# Patient Record
Sex: FEMALE | Race: WHITE | Employment: FULL TIME | URBAN - METROPOLITAN AREA
[De-identification: names, ages, dates, MRNs, and addresses within clinical notes are randomized per-mention and may not be internally consistent; named-entity substitution may affect disease eponyms.]

---

## 2018-07-19 PROBLEM — O60.00 PRETERM LABOR: Status: ACTIVE | Noted: 2018-07-19

## 2018-07-23 ENCOUNTER — HOSPITAL ENCOUNTER (EMERGENCY)
Age: 38
Discharge: HOME | End: 2018-07-23
Attending: OBSTETRICS & GYNECOLOGY | Admitting: OBSTETRICS & GYNECOLOGY
Payer: MEDICAID

## 2018-07-23 VITALS — BODY MASS INDEX: 33.82 KG/M2 | HEIGHT: 65 IN | TEMPERATURE: 97.5 F | WEIGHT: 203 LBS

## 2018-07-23 PROBLEM — Z34.90 PREGNANCY: Status: ACTIVE | Noted: 2018-07-23

## 2018-07-23 LAB
APPEARANCE UR: CLEAR
BILIRUB UR QL: NEGATIVE
COLOR UR: YELLOW
GLUCOSE UR QL STRIP.AUTO: NEGATIVE MG/DL
KETONES UR-MCNC: NEGATIVE MG/DL
LEUKOCYTE ESTERASE UR QL STRIP: ABNORMAL
NITRITE UR QL: NEGATIVE
PH UR: 7 [PH] (ref 5–9)
PROT UR QL: ABNORMAL MG/DL
RBC # UR STRIP: ABNORMAL /UL
SERVICE CMNT-IMP: ABNORMAL
SP GR UR: 1.02 (ref 1–1.02)
UROBILINOGEN UR QL: 0.2 EU/DL (ref 0.2–1)

## 2018-07-23 PROCEDURE — 99282 EMERGENCY DEPT VISIT SF MDM: CPT

## 2018-07-23 PROCEDURE — 74011250637 HC RX REV CODE- 250/637: Performed by: OBSTETRICS & GYNECOLOGY

## 2018-07-23 PROCEDURE — 81003 URINALYSIS AUTO W/O SCOPE: CPT

## 2018-07-23 RX ORDER — ACETAMINOPHEN 500 MG
1000 TABLET ORAL
Status: DISCONTINUED | OUTPATIENT
Start: 2018-07-23 | End: 2018-07-23 | Stop reason: HOSPADM

## 2018-07-23 RX ADMIN — ACETAMINOPHEN 1000 MG: 500 TABLET, FILM COATED ORAL at 02:49

## 2018-07-23 NOTE — IP AVS SNAPSHOT
[address] [place] 17 Patient: Adarsh Patel MRN: [id number] :1980 About your hospitalization You were admitted on:  N/A You last received care in the:  [facility] 2 [id number] [facility] You were discharged on:  2018 Why you were hospitalized Your primary diagnosis was:  Not on File Your diagnoses also included:  Pregnancy Follow-up Information Follow up With Details Comments Contact Info Tabatha Other, MD   Patient can only remember the practice name and not the physician Discharge Orders None A check isabel indicates which time of day the medication should be taken. My Medications ASK your doctor about these medications Instructions Each Dose to Equal  
 Morning Noon Evening Bedtime  
 folic acid 1 mg tablet Commonly known as:  Google Your last dose was: Your next dose is: Take 0.4 mg by mouth daily. 0.4 mg Iron 325 mg (65 mg iron) tablet Generic drug:  ferrous sulfate Your last dose was: Your next dose is: Take  by mouth Daily (before breakfast). PNV No12-Iron-FA-DSS-OM-3 29 mg iron-1 mg -50 mg Cpkd Your last dose was: Your next dose is: Take  by mouth daily. Discharge Instructions Please return to the hospital if you are experiencing bleeding, fluid leakage that soaks a pad, contractions that are 5 minutes apart, or fever. Please keep all scheduled appointments with your provider. Introducing [facility]! [employer] introduces Lang Ma patient portal. Now you can access parts of your medical record, email your doctor's office, and request medication refills online. 1. In your internet browser, go to [email or web address] 2. Click on the First Time User? Click Here link in the Sign In box. You will see the New Member Sign Up page. 3. Enter your Virgin Play Access Code exactly as it appears below. You will not need to use this code after youve completed the sign-up process. If you do not sign up before the expiration date, you must request a new code. · Virgin Play Access Code: [email or web address] Expires: 10/21/2018 12:48 AM 
 
4. Enter the last four digits of your Social Security Number (xxxx) and Date of Birth (mm/dd/yyyy) as indicated and click Submit. You will be taken to the next sign-up page. 5. Create a Virgin Play ID. This will be your Virgin Play login ID and cannot be changed, so think of one that is secure and easy to remember. 6. Create a Virgin Play password. You can change your password at any time. 7. Enter your Password Reset Question and Answer. This can be used at a later time if you forget your password. 8. Enter your e-mail address. You will receive e-mail notification when new information is available in [address] 9. Click Sign Up. You can now view and download portions of your medical record. 10. Click the Download Summary menu link to download a portable copy of your medical information. If you have questions, please visit the Frequently Asked Questions section of the Virgin Play website. Remember, Virgin Play is NOT to be used for urgent needs. For medical emergencies, dial 911. Now available from your iPhone and Android! Introducing Geoffrey Palma As a [facility] patient, I wanted to make you aware of our electronic visit tool called Geoffrey Palma. [facility] 24/7 allows you to connect within minutes with a medical provider 24 hours a day, seven days a week via a mobile device or tablet or logging into a secure website from your computer. You can access Geoffrey Palma from anywhere in the [place].  
 
A virtual visit might be right for you when you have a simple condition and feel like you just dont want to get out of bed, or cant get away from work for an appointment, when your regular [facility] provider is not available (evenings, weekends or holidays), or when youre out of town and need minor care. Electronic visits cost only $49 and if the [facility] 24/7 provider determines a prescription is needed to treat your condition, one can be electronically transmitted to a nearby pharmacy*. Please take a moment to enroll today if you have not already done so. The enrollment process is free and takes just a few minutes. To enroll, please download the [employer] 24/7 adali to your tablet or phone, or visit [email or web address] to enroll on your computer. And, as an [address] [place] [address] patient with a [employer] account, the results of your visits will be scanned into your electronic medical record and your primary care provider will be able to view the scanned results. We urge you to continue to see your [facility] for your ongoing medical care. And while your primary care provider may not be the one available when you seek a [employer] virtual visit, the peace of mind you get from getting a real diagnosis real time can be priceless. For more information on [employer], view our Frequently Asked Questions (FAQs) at [email or web address]. Sincerely, 
 
Alivia Cottrell MD 
Chief Medical Officer [facility] Hermelinda Mason *:  certain medications cannot be prescribed via [employer] Providers Seen During Your Hospitalization Provider Specialty Primary office phone Beth Cai MD Obstetrics & Gynecology [phone] Your Primary Care Physician (PCP) Primary Care Physician Office Phone Office Fax OTHER, PHYS ** None ** ** None ** You are allergic to the following Allergen Reactions Benadryl (Diphenhydramine Hcl) Rash  
    
 Sulfa (Sulfonamide Antibiotics) Angioedema Vicodin (Hydrocodone-Acetaminophen) Angioedema Ceclor (Cefaclor) Rash Darvon (Propoxyphene) Rash Erythromycin Rash Penicillins Hives Rash Singulair (Montelukast) Itching Recent Documentation Height Weight BMI OB Status Smoking Status 1.651 m 92.1 kg 33.78 kg/m2 Pregnant Former Smoker Emergency Contacts Name Discharge Info Relation Home Work Mobile Allison Fuller  Parent [1] [phone] Ema Fuller  Daughter [21] [phone] Patient Belongings The following personal items are in your possession at time of discharge: 
                             
 
  
  
Discharge Instructions Attachments/References PREGNANCY: WEEKS 34 TO 36 (ENGLISH) PREGNANCY: WHEN TO CALL (AFTER 20 WEEKS): GENERAL INFO (ENGLISH) PREGNANCY: BACK PAIN (ENGLISH) PREGNANCY: ABDOMINAL PAIN (ENGLISH) PREGNANCY: KELVIN DAVISON CONTRACTIONS (ENGLISH) PREGNANCY: PAIN MANAGEMENT DURING LABOR (ENGLISH) Patient Handouts Weeks 34 to 36 of Your Pregnancy: Care Instructions Your Care Instructions By now, your baby and your belly have grown quite large. It is almost time to give birth. A full-term pregnancy can deliver between 37 and 42 weeks. Your baby's lungs are almost ready to breathe air. The bones in your baby's head are now firm enough to protect it, but soft enough to move down through the birth canal. 
You may feel excited, happy, anxious, or scared. You may wonder how you will know if you are in labor or what to expect during labor. Try to be flexible in your expectations of the birth. Because each birth is different, there is no way to know exactly what childbirth will be like for you. This care sheet will help you know what to expect and how to prepare. This may make your childbirth easier.  
If you haven't already had the Tdap shot during this pregnancy, talk to your doctor about getting it. It will help protect your  against pertussis infection. In the 36th week, most women have a test for group B streptococcus (GBS). GBS is a common bacteria that can live in the vagina and rectum. It can make your baby sick after birth. If you test positive, you will get antibiotics during labor. The medicine will keep your baby from getting the bacteria. Follow-up care is a key part of your treatment and safety. Be sure to make and go to all appointments, and call your doctor if you are having problems. It's also a good idea to know your test results and keep a list of the medicines you take. How can you care for yourself at home? Learn about pain relief choices · Pain is different for every woman. Talk with your doctor about your feelings about pain. · You can choose from several types of pain relief. These include medicine or breathing techniques, as well as comfort measures. You can use more than one option. · If you choose to have pain medicine during labor, talk to your doctor about your options. Some medicines lower anxiety and help with some of the pain. Others make your lower body numb so that you won't feel pain. · Be sure to tell your doctor about your pain medicine choice before you start labor or very early in your labor. You may be able to change your mind as labor progresses. · Rarely, a woman is put to sleep by medicine given through a mask or an IV. Labor and delivery · The first stage of labor has three parts: early, active, and transition. ¨ Most women have early labor at home. You can stay busy or rest, eat light snacks, drink clear fluids, and start counting contractions. ¨ When talking during a contraction gets hard, you may be moving to active labor. During active labor, you should head for the hospital if you are not there already.  
¨ You are in active labor when contractions come every 3 to 4 minutes and last about 60 seconds. Your cervix is opening more rapidly. ¨ If your water breaks, contractions will come faster and stronger. ¨ During transition, your cervix is stretching, and contractions are coming more rapidly. ¨ You may want to push, but your cervix might not be ready. Your doctor will tell you when to push. · The second stage starts when your cervix is completely opened and you are ready to push. ¨ Contractions are very strong to push the baby down the birth canal. 
¨ You will feel the urge to push. You may feel like you need to have a bowel movement. ¨ You may be coached to push with contractions. These contractions will be very strong, but you will not have them as often. You can get a little rest between contractions. ¨ You may be emotional and irritable. You may not be aware of what is going on around you. ¨ One last push, and your baby is born. · The third stage is when a few more contractions push out the placenta. This may take 30 minutes or less. · The fourth stage is the welcome recovery. You may feel overwhelmed with emotions and exhausted but alert. This is a good time to start breastfeeding. Where can you learn more? Go to http://janina-real.info/. Enter Q643 in the search box to learn more about \"Weeks 34 to 36 of Your Pregnancy: Care Instructions. \" Current as of: November 21, 2017 Content Version: 11.7 © 1031-1500 Healthwise, Incorporated. Care instructions adapted under license by [employer] (which disclaims liability or warranty for this information). If you have questions about a medical condition or this instruction, always ask your healthcare professional. [place] [address] any warranty or liability for your use of this information. Learning About When to Call Your Doctor During Pregnancy (After 20 Weeks) Your Care Instructions It's common to have concerns about what might be a problem during pregnancy. Although most pregnant women don't have any serious problems, it's important to know when to call your doctor if you have certain symptoms or signs of labor. These are general suggestions. Your doctor may give you some more information about when to call. When to call your doctor (after 20 weeks) Call 911 anytime you think you may need emergency care. For example, call if: 
· You have severe vaginal bleeding. · You have sudden, severe pain in your belly. · You passed out (lost consciousness). · You have a seizure. · You see or feel the umbilical cord. · You think you are about to deliver your baby and can't make it safely to the hospital. 
Call your doctor now or seek immediate medical care if: 
· You have vaginal bleeding. · You have belly pain. · You have a fever. · You have symptoms of preeclampsia, such as: 
¨ Sudden swelling of your face, hands, or feet. ¨ New vision problems (such as dimness or blurring). ¨ A severe headache. · You have a sudden release of fluid from your vagina. (You think your water broke.) · You think that you may be in labor. This means that you've had at least 4 contractions within 20 minutes or at least 8 contractions in an hour. · You notice that your baby has stopped moving or is moving much less than normal. 
· You have symptoms of a urinary tract infection. These may include: 
¨ Pain or burning when you urinate. ¨ A frequent need to urinate without being able to pass much urine. ¨ Pain in the flank, which is just below the rib cage and above the waist on either side of the back. ¨ Blood in your urine. Watch closely for changes in your health, and be sure to contact your doctor if: 
· You have vaginal discharge that smells bad. · You have skin changes, such as: ¨ A rash. ¨ Itching. ¨ Yellow color to your skin. · You have other concerns about your pregnancy. If you have labor signs at 37 weeks or more If you have signs of labor at 37 weeks or more, your doctor may tell you to call when your labor becomes more active. Symptoms of active labor include: 
· Contractions that are regular. · Contractions that are less than 5 minutes apart. · Contractions that are hard to talk through. Follow-up care is a key part of your treatment and safety. Be sure to make and go to all appointments, and call your doctor if you are having problems. It's also a good idea to know your test results and keep a list of the medicines you take. Where can you learn more? Go to http://janinaCallystroreal.info/. Enter  in the search box to learn more about \"Learning About When to Call Your Doctor During Pregnancy (After 20 Weeks). \" 
Current as of: November 21, 2017 Content Version: 11.7 © 8523-0392 [employer]. Care instructions adapted under license by [employer] (which disclaims liability or warranty for this information). If you have questions about a medical condition or this instruction, always ask your healthcare professional. [place] [address] any warranty or liability for your use of this information. Backache During Pregnancy: Care Instructions Your Care Instructions Back pain has many possible causes. It is often caused by problems with muscles and ligaments in your back. The extra weight during pregnancy can put stress on your back. Moving, lifting, standing, sitting, or sleeping in an awkward way also can strain your back. Back pain can also be a sign of labor. Although it may hurt a lot, back pain often improves on its own. Use good home treatment, and take care not to stress your back. Follow-up care is a key part of your treatment and safety. Be sure to make and go to all appointments, and call your doctor if you are having problems. It's also a good idea to know your test results and keep a list of the medicines you take. How can you care for yourself at home? · Ask your doctor about taking acetaminophen (Tylenol) for pain. Do not take aspirin, ibuprofen (Advil, Motrin), or naproxen (Aleve). · Do not take two or more pain medicines at the same time unless the doctor told you to. Many pain medicines have acetaminophen, which is Tylenol. Too much acetaminophen (Tylenol) can be harmful. · Lie on your side with your knees and hips bent and a pillow between your legs. This reduces stress on your back. · Put ice or cold packs on your back for 10 to 20 minutes at a time, several times a day. Put a thin cloth between the ice and your skin. · Warm baths may also help reduce pain. · Change positions every 30 minutes. Take breaks if you must sit for a long time. Get up and walk around. · Ask your doctor about how much exercise you can do. You may feel better taking short walks or doing gentle movements and stretching in a swimming pool. · Ask your doctor about exercises to stretch and strengthen your back. When should you call for help? Call your doctor now or seek immediate medical care if: 
  · You think you are in labor.  
  · You have new numbness in your buttocks, genital or rectal areas, or legs.  
  · You have a new loss of bowel or bladder control.  
 Watch closely for changes in your health, and be sure to contact your doctor if: 
  · You do not get better as expected. Where can you learn more? Go to http://janina-real.info/. Enter G060 in the search box to learn more about \"Backache During Pregnancy: Care Instructions. \" Current as of: November 21, 2017 Content Version: 11.7 © 9505-3982 [employer]. Care instructions adapted under license by [employer]) (which disclaims liability or warranty for this information).  If you have questions about a medical condition or this instruction, always ask your healthcare professional. Meghna Blakely, Incorporated disclaims any warranty or liability for your use of this information. Belly Pain in Pregnancy: Care Instructions Your Care Instructions When you're pregnant, any belly pain can be a worry. You may not want to call your doctor about every pain you have. But you don't want to miss something that is dangerous for you or your baby. Even if it feels familiar, belly pain can mean something new when you're pregnant. It's important to know when to call your doctor. It will also help to know how to care for yourself at home when your pain is not caused by anything harmful. · When belly pain is more severe or constant, see a doctor right away. · If you're sure your belly pain is a sign of labor, call your doctor. · When belly pain is brief, it's usually a normal part of pregnancy. It might be related to changes in the growing uterus. Or it could be the stretching of ligaments called round ligaments. These ligaments help support the uterus. Round ligament pain can be on either side of your belly. It can also be felt in your hips or groin. Follow-up care is a key part of your treatment and safety. Be sure to make and go to all appointments, and call your doctor if you are having problems. It's also a good idea to know your test results and keep a list of the medicines you take. How can you tell if belly pain is a sign of labor? When belly pain is caused by labor, it can feel like mild or menstrual-like cramps in your lower belly. These cramps are probably contractions. They can happen in your second or third trimester. You may also have: · A steady, dull ache in your lower back, pelvis, or thighs. · A feeling of pressure in your pelvis or lower belly. · Changes in your vaginal discharge or a sudden release of fluid from the vagina. If you think you are in labor, call your doctor. How can you care for yourself at home? When belly pain is mild and is not a symptom of labor: · Rest until you feel better. · Take a warm bath. · Think about what you drink and eat: ¨ Drink plenty of fluids. Choose water and other caffeine-free clear liquids until you feel better. ¨ Try eating small, frequent meals. If your stomach is upset, try bland, low-fat foods like plain rice, broiled chicken, toast, and yogurt. · Think about how you move if you are having brief pains from stretching of the round ligaments. ¨ Try gentle stretching. ¨ Move a little more slowly when turning in bed or getting up from a chair, so those ligaments don't stretch quickly. ¨ Lean forward a bit if you think you are going to cough or sneeze. When should you call for help? Call 911 anytime you think you may need emergency care. For example, call if: 
  · You have sudden, severe pain in your belly.  
  · You have severe vaginal bleeding.  
 Call your doctor now or seek immediate medical care if: 
  · You have new or worse belly pain or cramping.  
  · You have any vaginal bleeding.  
  · You have a fever.  
  · You have symptoms of preeclampsia, such as: 
¨ Sudden swelling of your face, hands, or feet. ¨ New vision problems (such as dimness or blurring). ¨ A severe headache.  
  · You think that you may be in labor. This means that you've had at least 8 contractions within 1 hour or at least 4 contractions within 20 minutes, even after you change your position and drink fluids.  
  · You have symptoms of a urinary tract infection. These may include: 
¨ Pain or burning when you urinate. ¨ A frequent need to urinate without being able to pass much urine. ¨ Pain in the flank, which is just below the rib cage and above the waist on either side of the back. ¨ Blood in your urine.  
 Watch closely for changes in your health, and be sure to contact your doctor if you are worried about your or your baby's health. Where can you learn more? Go to http://janina-real.info/. Enter 448 [id number] [id number] in the search box to learn more about \"Belly Pain in Pregnancy: Care Instructions. \" Current as of: November 21, 2017 Content Version: 11.7 © 7626-4807 [employer]. Care instructions adapted under license by [employer] (which disclaims liability or warranty for this information). If you have questions about a medical condition or this instruction, always ask your healthcare professional. [place] [address] any warranty or liability for your use of this information. Arti Rice Contractions: Care Instructions Your Care Instructions Beto Nathan contractions prepare your uterus for labor. Think of them as a \"warm-up\" exercise that your body does. You may begin to feel them between the 28th and 30th weeks of your pregnancy. But they start as early as the 20th week. Bowman Nathan contractions usually occur more often during the ninth month. They may go away when you are active and return when you rest. These contractions are like mild contractions of true labor, but they occur less often. (You feel fewer than 8 in an hour.) They don't cause your cervix to open. It may be hard for you to tell the difference between Arti Rice contractions and true labor, especially in your first pregnancy. Follow-up care is a key part of your treatment and safety. Be sure to make and go to all appointments, and call your doctor if you are having problems. It's also a good idea to know your test results and keep a list of the medicines you take. How can you care for yourself at home? · Try a warm bath to help relieve muscle tension and reduce pain. · Change positions every 30 minutes. Take breaks if you must sit for a long time. Get up and walk around. · Drink plenty of water, enough so that your urine is light yellow or clear like water. · Taking short walks may help you feel better. Your doctor needs to check any contractions that are getting stronger or closer together. Where can you learn more? Go to http://janina-real.info/. Enter 504 [id number] 727 in the search box to learn more about \"Beto Nathan Contractions: Care Instructions. \" Current as of: November 21, 2017 Content Version: 11.7 © 2396-4685 [employer]. Care instructions adapted under license by [employer] (which disclaims liability or warranty for this information). If you have questions about a medical condition or this instruction, always ask your healthcare professional. [place] [address] any warranty or liability for your use of this information. Pain Relief During Labor: Care Instructions Your Care Instructions You can choose from a few types of pain relief for childbirth. These include: · Medicine. Your doctor may offer different types of pain medicine while you are in labor. · Breathing techniques. · Comfort measures. This is often called natural childbirth. You also can use a combination of these choices. You can write down your choices for pain relief in a birth plan. A birth plan is a list of what you want during labor. Your personal needs are important when you make this choice. The right choice is the one that feels right to you. Every labor is different. Some women go into labor planning to have natural childbirth and later find that they need pain relief. Plan for what you want. But be aware that you may change your mind during labor. Follow-up care is a key part of your treatment and safety. Be sure to make and go to all appointments, and call your doctor if you are having problems. It's also a good idea to know your test results and keep a list of the medicines you take. What medicines can you use for pain relief? If you decide to take medicine to help your pain during labor, here are some medicines that may be used. Local anesthesia. You get a shot of medicine to numb the area if your doctor needs to make a cut to widen the vaginal opening. Regional anesthesia. A doctor can inject medicine into a space around the spinal cord. This is called an epidural. It can be used during labor to numb your lower body. But lack of feeling sometimes makes it harder to push. A spinal block is an injection of pain medicine into the spinal fluid. It quickly and fully numbs the pelvic area. In some cases, a doctor may combine a spinal block with an epidural. 
Opioids. These are pain relievers given through a vein or as a shot in the muscle. They include nalbuphine (Nubain), butorphanol (Stadol), and fentanyl. They can ease anxiety and pain. But they don't stop pain completely. Usually they aren't used right before delivery because they can slow the baby's breathing. What comfort measures can you use for pain relief? · Breathing techniques: Breathing in a rhythm can distract you from pain. Childbirth classes can teach you how to do focused breathing. · Distraction: You can walk, play cards, listen to music, watch TV, take a shower, or read. These can help take your mind off your contractions. · Massage: Your birth partner can massage your shoulders and lower back during contractions. Strong massage of the back muscles during contractions may reduce back labor pain. · Imagery: You can imagine a peaceful place. For instance, you can think of contractions as waves rolling over you. When should you call for help? Watch closely for changes in your health, and be sure to contact your doctor if: 
  · You want to learn more about pain relief. Where can you learn more? Go to http://janina-real.info/. Enter Paty Cerda in the search box to learn more about \"Pain Relief During Labor: Care Instructions. \" Current as of: November 21, 2017 Content Version: 11.7 © 8970-3628 [employer]. Care instructions adapted under license by [employer] (which disclaims liability or warranty for this information). If you have questions about a medical condition or this instruction, always ask your healthcare professional. [address] any warranty or liability for your use of this information. Please provide this summary of care documentation to your next provider. Signatures-by signing, you are acknowledging that this After Visit Summary has been reviewed with you and you have received a copy. Patient Signature:  ____________________________________________________________ Date:  ____________________________________________________________  
  
Perri Ellis Provider Signature:  ____________________________________________________________ Date:  ____________________________________________________________

## 2018-07-23 NOTE — H&P
History and Physical      Pt is a  at 34w3d for contractions. She was seen at [facility] and admitted for  labor on . She started on nifedipine x 48hrs, BMZ, and Vancomycin for GBS ppx. She was discharged home on after her exam was noted to be stable. Since then she has been in and out of L&D at [facility] for similar complaints, most recently was early this am. Pt came her because she did not agree with the management at [facility]. She noted to be 2/50/-3 at that the time. She denies lof, vb. +FM. Her prenatal course has been complicated by AMA s/p nl NIPT and MSAFP with . .  Please refer to prenatal record for complete information regarding prenatal course. Visit Vitals    Temp 97.5 °F (36.4 °C)    Ht 5' 5\" (1.651 m)    Wt 203 lb (92.1 kg)    LMP 2017    BMI 33.78 kg/m2     FHT: 145bpm, mod variability, +acels, no decels  Pine: ctx irregularly q 5-11 minutes. SVE checked x2 noted to be 2/50-75/-3 unchanged. A/P:  Reassuring maternal and fetal status. No evidence of labor   Ok to Discharge home with  labor precuations   F/u with primary OB as scheduled.

## 2018-07-23 NOTE — DISCHARGE INSTRUCTIONS
Please return to the hospital if you are experiencing bleeding, fluid leakage that soaks a pad, contractions that are 5 minutes apart, or fever. Please keep all scheduled appointments with your provider.

## 2018-07-23 NOTE — IP AVS SNAPSHOT
[address] Patient: Genet Lopez MRN: [id number] :1980 A check isabel indicates which time of day the medication should be taken. My Medications ASK your doctor about these medications Instructions Each Dose to Equal  
 Morning Noon Evening Bedtime  
 folic acid 1 mg tablet Commonly known as:  Google Your last dose was: Your next dose is: Take 0.4 mg by mouth daily. 0.4 mg Iron 325 mg (65 mg iron) tablet Generic drug:  ferrous sulfate Your last dose was: Your next dose is: Take  by mouth Daily (before breakfast). PNV No12-Iron-FA-DSS-OM-3 29 mg iron-1 mg -50 mg Cpkd Your last dose was: Your next dose is: Take  by mouth daily.

## 2018-07-23 NOTE — PROGRESS NOTES
0150- 34W3D. .  patient to L&D with complaint of contractions that have been occurring since day before yesterday. Patient states that the contractions are located in her lower back a vaginal area. Patient was treated with betamethasone injections x 2 . Patient reports previous delivery at 36 weeks. Patient is seen by Krish Hilario. Patient records show she was discharged from [facility] at [facility].      Matthwe Hadley- SVE 2-2.5/50/-2    0230- Dr. Berta Ng paged. Report given as above. Verbal order to give tylenol and recheck patient in 1 hr.     0235- Dr. Berta Ng paged regarding patients allergy to prescribed pain medication. Verbal order to give tylenol 1000 mg.      0305- SVE 2-2.5/50/-2    8187- Dr. Berta gN paged. SVE report given. Verbal order to discharge patient. 2442- Patient discharged home with instructions.

## 2018-07-23 NOTE — IP AVS SNAPSHOT
Summary of Care Report The Summary of Care report has been created to help improve care coordination. Users with access to [employer] or [address] (Web-based application) may access additional patient information including the Discharge Summary. If you are not currently a [address] user and need more information, please call the number listed below in the [place] [address] section and ask to be connected with Medical Records. Facility Information Name Address Phone [address] [facility]  [address] [facility] [address] [phone] Patient Information Patient Name Sex JOHN Hernandez ([id number]) Female 1980 Discharge Information Admitting Provider Service Area Unit Dora Rhoades MD / [address] Labor & Delivery / [phone] Discharge Provider Discharge Date/Time Discharge Disposition Destination (none) 2018 (Pending) AHR (none) Patient Language Language ENGLISH [13] Hospital Problems as of 2018  Never Reviewed Class Noted - Resolved Last Modified POA Active Problems Pregnancy  2018 - Present 2018 by Dora Rhoades MD Unknown Entered by Dora Rhoades MD  
  
Non-Hospital Problems as of 2018  Never Reviewed Class Noted - Resolved Last Modified Active Problems  labor  2018 - Present 2018 by Krysta Camejo MD  
  Entered by Krysta Camejo MD  
  
You are allergic to the following Allergen Reactions Benadryl (Diphenhydramine Hcl) Rash  
    
 Sulfa (Sulfonamide Antibiotics) Angioedema Vicodin (Hydrocodone-Acetaminophen) Angioedema Ceclor (Cefaclor) Rash Darvon (Propoxyphene) Rash Erythromycin Rash Penicillins Hives Rash Singulair (Montelukast) Itching Current Discharge Medication List  
  
ASK your doctor about these medications Dose & Instructions Dispensing Information Comments  
 folic acid 1 mg tablet Commonly known as:  Google Dose:  0.4 mg Take 0.4 mg by mouth daily. Refills:  0 Iron 325 mg (65 mg iron) tablet Generic drug:  ferrous sulfate Take  by mouth Daily (before breakfast). Refills:  0  
   
 PNV No12-Iron-FA-DSS-OM-3 29 mg iron-1 mg -50 mg Cpkd Take  by mouth daily. Refills:  0 Follow-up Information Follow up With Details Comments Contact Info Phys Other, MD   Patient can only remember the practice name and not the physician Discharge Instructions Please return to the hospital if you are experiencing bleeding, fluid leakage that soaks a pad, contractions that are 5 minutes apart, or fever. Please keep all scheduled appointments with your provider. Chart Review Routing History Recipient Method Report Sent By Miles YOUNG Fax: [phone] Fax [facility] IP AMB RESULT REPORT IMAGING Malia Rosales [[id number]] 5/4/2014  7:32 PM 05/04/2014

## 2018-08-14 PROBLEM — Z34.93 PREGNANT AND NOT YET DELIVERED IN THIRD TRIMESTER: Status: ACTIVE | Noted: 2018-08-14

## 2021-07-26 ENCOUNTER — APPOINTMENT (OUTPATIENT)
Dept: GENERAL RADIOLOGY | Age: 41
End: 2021-07-26
Attending: EMERGENCY MEDICINE
Payer: MEDICAID

## 2021-07-26 ENCOUNTER — HOSPITAL ENCOUNTER (EMERGENCY)
Age: 41
Discharge: HOME | End: 2021-07-26
Attending: EMERGENCY MEDICINE
Payer: MEDICAID

## 2021-07-26 VITALS
HEART RATE: 99 BPM | DIASTOLIC BLOOD PRESSURE: 76 MMHG | HEIGHT: 65 IN | BODY MASS INDEX: 29.16 KG/M2 | OXYGEN SATURATION: 99 % | TEMPERATURE: 98.6 F | SYSTOLIC BLOOD PRESSURE: 124 MMHG | RESPIRATION RATE: 18 BRPM | WEIGHT: 175 LBS

## 2021-07-26 DIAGNOSIS — S93.401A SPRAIN OF RIGHT ANKLE, UNSPECIFIED LIGAMENT, INITIAL ENCOUNTER: Primary | ICD-10-CM

## 2021-07-26 PROCEDURE — 74011250637 HC RX REV CODE- 250/637: Performed by: EMERGENCY MEDICINE

## 2021-07-26 PROCEDURE — 73590 X-RAY EXAM OF LOWER LEG: CPT

## 2021-07-26 PROCEDURE — 73610 X-RAY EXAM OF ANKLE: CPT

## 2021-07-26 PROCEDURE — 99282 EMERGENCY DEPT VISIT SF MDM: CPT

## 2021-07-26 PROCEDURE — 73630 X-RAY EXAM OF FOOT: CPT

## 2021-07-26 RX ORDER — IBUPROFEN 400 MG/1
800 TABLET ORAL
Status: COMPLETED | OUTPATIENT
Start: 2021-07-26 | End: 2021-07-26

## 2021-07-26 RX ORDER — ACETAMINOPHEN 500 MG
1000 TABLET ORAL
Status: COMPLETED | OUTPATIENT
Start: 2021-07-26 | End: 2021-07-26

## 2021-07-26 RX ADMIN — ACETAMINOPHEN 1000 MG: 500 TABLET ORAL at 22:08

## 2021-07-26 RX ADMIN — IBUPROFEN 800 MG: 400 TABLET, FILM COATED ORAL at 22:08

## 2021-07-26 NOTE — LETTER
NOTIFICATION RETURN TO WORK / SCHOOL    7/26/2021 10:37 PM    Ms. Agustin Anderson  [address] [address]      To Whom It May Concern:    Agustin Anderson is currently under the care of [address] [facility] EMERGENCY DEPT. She will return to work/school on: 7/27/2021    Agustin Anderson may return to work/school with the following restrictions: She must be allowed to elevate her right foot and ankle while she is sitting. Her foot should be allowed to go above the level of her hips. She should also be able to remove any footwear while sitting. .      If there are questions or concerns please have the patient contact our office.  [phone]        Sincerely,      Wilfredo Desir MD

## 2021-07-27 NOTE — ED PROVIDER NOTES
EMERGENCY DEPARTMENT HISTORY AND PHYSICAL EXAM      Date: 7/26/2021  Patient Name: Zi Franklin    History of Presenting Illness     Chief Complaint   Patient presents with    Ankle Injury     right       History (Context): Zi Franklin is a 36 y.o. woman with past medical history as noted below who presents with acute onset, persistent, severe right ankle pain after falling twisting her ankle. On review of systems, the patient denies fever, chills, rashes, numbness    PCP: None    Current Facility-Administered Medications   Medication Dose Route Frequency Provider Last Rate Last Admin    acetaminophen (TYLENOL) tablet 1,000 mg  1,000 mg Oral NOW Oj Rehman MD        ibuprofen (MOTRIN) tablet 800 mg  800 mg Oral NOW Oj Rehman MD         Current Outpatient Medications   Medication Sig Dispense Refill    OTHER       ibuprofen (MOTRIN) 800 mg tablet Take 1 Tab by mouth every six (6) hours as needed. 30 Tab 0    oxyCODONE-acetaminophen (PERCOCET) 5-325 mg per tablet Take 1-2 Tabs by mouth every eight (8) hours as needed. Max Daily Amount: 6 Tabs.  12 Tab 0       Past History     Past Medical History:  Past Medical History:   Diagnosis Date    Asthma     Back pain     Chronic sinusitis     Endometriosis     Gastrointestinal disorder     Migraines     Ovarian cyst     Polycystic disease, ovaries        Past Surgical History:  Past Surgical History:   Procedure Laterality Date    HX CHOLECYSTECTOMY      HX GYN      ovarian cyst removal, D&C and laproscopic procedure for Endometriosis       Family History:  Family History   Problem Relation Age of Onset    Cancer Mother     Diabetes Mother     Migraines Mother     Migraines Brother     Cancer Maternal Aunt     Diabetes Maternal Aunt     Cancer Maternal Uncle     Diabetes Maternal Uncle     Cancer Maternal Grandmother     Diabetes Maternal Grandmother     Heart Disease Maternal Grandmother     Stroke Maternal Grandmother  Cancer Paternal Grandmother     Cancer Paternal Grandfather        Social History:  Social History     Tobacco Use    Smoking status: Current Every Day Smoker     Packs/day: 1.00    Smokeless tobacco: Never Used    Tobacco comment: 2 cigarettes a day   Substance Use Topics    Alcohol use: No    Drug use: No       Allergies: Allergies   Allergen Reactions    Benadryl [Diphenhydramine Hcl] Rash    Sulfa (Sulfonamide Antibiotics) Angioedema    Vicodin [Hydrocodone-Acetaminophen] Angioedema    Amoxicillin Hives and Rash    Ceclor [Cefaclor] Rash    Darvon [Propoxyphene] Rash    Erythromycin Rash    Penicillins Hives and Rash    Singulair [Montelukast] Itching       PMH, PSH, family history, social history, allergies reviewed with the patient with significant items noted above. Review of Systems   As per HPI, otherwise reviewed and negative. Physical Exam     Vitals:    07/26/21 2044   BP: 124/76   Pulse: 99   Resp: 18   Temp: 98.6 °F (37 °C)   SpO2: 99%   Weight: 79.4 kg (175 lb)   Height: 5' 5\" (1.651 m)       Gen: Well-appearing, in no acute distress   HEENT: Normocephalic, sclera anicteric  Cardiovascular: Normal rate, regular rhythm, no murmurs, rubs, gallops. Pulses intact and equal distally. Pulmonary: No respiratory distress. No stridor. Clear lungs. ABD: Soft, nontender, nondistended. Neuro: Alert. Normal speech. Normal mentation. Psych: Normal thought content and thought processes. : No CVA tenderness  EXT: Pain with squeezing the right calf, pain with range of motion of the right ankle, pain with palpation of the distal foot. Moves all extremities well. No cyanosis or clubbing. Skin: Warm and well-perfused. Other:        Diagnostic Study Results     Labs -   No results found for this or any previous visit (from the past 12 hour(s)).     Radiologic Studies -   XR ANKLE RT MIN 3 V    (Results Pending)   XR FOOT RT MIN 3 V    (Results Pending)   XR TIB/FIB RT    (Results Pending)     CT Results  (Last 48 hours)    None        CXR Results  (Last 48 hours)    None            Medical Decision Making   I am the first provider for this patient. I reviewed the vital signs, available nursing notes, past medical history, past surgical history, family history and social history. Vital Signs-Reviewed the patient's vital signs     Records Reviewed: Personally, on initial evaluation    MDM:   Patient presents with foot and ankle pain after twisting mechanism. Exam significant for pain with range of motion multiple joints. DDX considered: Fracture, dislocation sprain      Patient condition on initial evaluation: Stable    Plan:       Orders as below:  Orders Placed This Encounter    XR ANKLE RT MIN 3 V    XR FOOT RT MIN 3 V    XR TIB/FIB RT    acetaminophen (TYLENOL) tablet 1,000 mg    ibuprofen (MOTRIN) tablet 800 mg        ED Course:          Patient condition at time of disposition: Stable  DISCHARGE NOTE:   Pt has been reexamined. Patient has no new complaints, changes, or physical findings. Care plan outlined and precautions discussed. Results were reviewed with the patient. All medications were reviewed with the patient; will d/c home with no change in meds. All of pt's questions and concerns were addressed. Alarm symptoms and return precautions associated with chief complaint and evaluation were reviewed with the patient in detail. The patient demonstrated adequate understanding. Patient was instructed and agrees to follow up with PCP, as well as to return to the ED upon further deterioration. Patient is ready to go home.   The patient is happy with this plan    Follow-up Information     Follow up With Specialties Details [address] [place] [address] EMERGENCY DEPT Emergency Medicine Go to  As needed, If symptoms worsen [address] [facility]  [phone]    your PCP  Go to  As needed, If symptoms worsen           Current Discharge Medication List          Procedures:  Procedures      Critical Care Time:     Disposition: Discharge home    Diagnosis     Clinical Impression:   1. Sprain of right ankle, unspecified ligament, initial encounter        Signed,  Eufemia Fernandes MD  Emergency Physician  [facility]    As a voice dictation software was utilized to dictate this note, minor word transpositions can occur. I apologize for confusing wording and typographic errors. Please feel free to contact me for clarification.

## 2022-03-18 PROBLEM — O60.00 PRETERM LABOR: Status: ACTIVE | Noted: 2018-07-19

## 2022-03-19 PROBLEM — Z34.93 PREGNANT AND NOT YET DELIVERED IN THIRD TRIMESTER: Status: ACTIVE | Noted: 2018-08-14

## 2022-03-19 PROBLEM — Z34.90 PREGNANCY: Status: ACTIVE | Noted: 2018-07-23

## 2024-06-26 ENCOUNTER — HOSPITAL ENCOUNTER (EMERGENCY)
Facility: HOSPITAL | Age: 44
Discharge: HOME | End: 2024-06-26
Attending: STUDENT IN AN ORGANIZED HEALTH CARE EDUCATION/TRAINING PROGRAM

## 2024-06-26 VITALS
RESPIRATION RATE: 16 BRPM | DIASTOLIC BLOOD PRESSURE: 71 MMHG | HEART RATE: 103 BPM | OXYGEN SATURATION: 100 % | HEIGHT: 65 IN | BODY MASS INDEX: 28.32 KG/M2 | TEMPERATURE: 97.5 F | SYSTOLIC BLOOD PRESSURE: 115 MMHG | WEIGHT: 170 LBS

## 2024-06-26 DIAGNOSIS — Z20.2 ENCOUNTER FOR ASSESSMENT OF STD EXPOSURE: Primary | ICD-10-CM

## 2024-06-26 LAB
APPEARANCE UR: ABNORMAL
BACTERIA URNS QL MICRO: ABNORMAL /HPF
BILIRUB UR QL: NEGATIVE
COLOR UR: YELLOW
EPITH CASTS URNS QL MICRO: ABNORMAL /LPF (ref 0–5)
GLUCOSE UR STRIP.AUTO-MCNC: NEGATIVE MG/DL
HCG UR QL: NEGATIVE
HGB UR QL STRIP: ABNORMAL
KETONES UR QL STRIP.AUTO: ABNORMAL MG/DL
LEUKOCYTE ESTERASE UR QL STRIP.AUTO: NEGATIVE
MUCOUS THREADS URNS QL MICRO: ABNORMAL /LPF
NITRITE UR QL STRIP.AUTO: NEGATIVE
PH UR STRIP: 5.5 (ref 5–8)
PROT UR STRIP-MCNC: ABNORMAL MG/DL
RBC #/AREA URNS HPF: ABNORMAL /HPF (ref 0–5)
SERVICE CMNT-IMP: NORMAL
SP GR UR REFRACTOMETRY: 1.03 (ref 1–1.03)
UROBILINOGEN UR QL STRIP.AUTO: 1 EU/DL (ref 0.2–1)
WBC URNS QL MICRO: ABNORMAL /HPF (ref 0–4)
WET PREP GENITAL: NORMAL

## 2024-06-26 PROCEDURE — 99283 EMERGENCY DEPT VISIT LOW MDM: CPT

## 2024-06-26 PROCEDURE — 81001 URINALYSIS AUTO W/SCOPE: CPT

## 2024-06-26 PROCEDURE — 87591 N.GONORRHOEAE DNA AMP PROB: CPT

## 2024-06-26 PROCEDURE — 81025 URINE PREGNANCY TEST: CPT

## 2024-06-26 PROCEDURE — 87661 TRICHOMONAS VAGINALIS AMPLIF: CPT

## 2024-06-26 PROCEDURE — 87210 SMEAR WET MOUNT SALINE/INK: CPT

## 2024-06-26 PROCEDURE — 87491 CHLMYD TRACH DNA AMP PROBE: CPT

## 2024-06-26 RX ORDER — ONDANSETRON 8 MG/1
TABLET, ORALLY DISINTEGRATING ORAL
COMMUNITY
Start: 2024-04-17

## 2024-06-26 ASSESSMENT — LIFESTYLE VARIABLES
HOW MANY STANDARD DRINKS CONTAINING ALCOHOL DO YOU HAVE ON A TYPICAL DAY: PATIENT DOES NOT DRINK
HOW OFTEN DO YOU HAVE A DRINK CONTAINING ALCOHOL: NEVER

## 2024-06-26 ASSESSMENT — PAIN - FUNCTIONAL ASSESSMENT: PAIN_FUNCTIONAL_ASSESSMENT: NONE - DENIES PAIN

## 2024-06-26 NOTE — ED PROVIDER NOTES
Use Topics    Alcohol use: No    Drug use: No       Allergies:  Allergies   Allergen Reactions    Diphenhydramine Rash    Hydrocodone-Acetaminophen Angioedema    Sulfa Antibiotics Angioedema    Montelukast Itching    Cefaclor Rash    Erythromycin Rash    Penicillins Hives and Rash    Propoxyphene Rash         Review of Systems       Review of Systems   Constitutional:  Negative for activity change, fatigue and fever.   Respiratory:  Negative for chest tightness and shortness of breath.    Cardiovascular:  Negative for chest pain.   Gastrointestinal:  Negative for abdominal pain, diarrhea, nausea and vomiting.   Genitourinary:  Negative for decreased urine volume, difficulty urinating, dysuria, flank pain, frequency, hematuria, menstrual problem, pelvic pain, urgency, vaginal bleeding, vaginal discharge and vaginal pain.   Musculoskeletal:  Negative for arthralgias and myalgias.   Skin:  Negative for rash and wound.   Neurological:  Negative for dizziness, weakness, light-headedness, numbness and headaches.   Psychiatric/Behavioral:  Negative for agitation.          Physical Exam   /71   Pulse (!) 103   Temp 97.5 °F (36.4 °C) (Oral)   Resp 16   Ht 1.651 m (5' 5\")   Wt 77.1 kg (170 lb)   LMP 05/26/2024 (Approximate)   SpO2 100%   BMI 28.29 kg/m²       Physical Exam  Constitutional:       General: She is not in acute distress.     Appearance: She is not ill-appearing.   HENT:      Head: Normocephalic and atraumatic.   Cardiovascular:      Rate and Rhythm: Normal rate and regular rhythm.   Pulmonary:      Effort: Pulmonary effort is normal.      Breath sounds: Normal breath sounds.   Abdominal:      General: Abdomen is flat.      Palpations: Abdomen is soft.      Tenderness: There is no abdominal tenderness.   Musculoskeletal:         General: No swelling or deformity. Normal range of motion.   Skin:     General: Skin is warm and dry.      Capillary Refill: Capillary refill takes less than 2 seconds.

## 2024-06-26 NOTE — ED TRIAGE NOTES
Pt to ED for STD check. Pt denies any new vaginal discharge. Denies any recent sexual contact. Denies pelvic pain/dysuria.

## 2024-06-27 LAB
C TRACH RRNA SPEC QL NAA+PROBE: NEGATIVE
N GONORRHOEA RRNA SPEC QL NAA+PROBE: NEGATIVE
SPECIMEN SOURCE: NORMAL
T VAGINALIS RRNA SPEC QL NAA+PROBE: NEGATIVE

## 2024-06-28 ASSESSMENT — ENCOUNTER SYMPTOMS
NAUSEA: 0
VOMITING: 0
DIARRHEA: 0
SHORTNESS OF BREATH: 0
CHEST TIGHTNESS: 0
ABDOMINAL PAIN: 0

## 2024-11-20 ENCOUNTER — APPOINTMENT (OUTPATIENT)
Facility: HOSPITAL | Age: 44
End: 2024-11-20
Attending: EMERGENCY MEDICINE

## 2024-11-20 ENCOUNTER — HOSPITAL ENCOUNTER (EMERGENCY)
Facility: HOSPITAL | Age: 44
Discharge: HOME | End: 2024-11-20
Attending: STUDENT IN AN ORGANIZED HEALTH CARE EDUCATION/TRAINING PROGRAM

## 2024-11-20 VITALS
SYSTOLIC BLOOD PRESSURE: 113 MMHG | HEART RATE: 78 BPM | OXYGEN SATURATION: 99 % | WEIGHT: 170 LBS | DIASTOLIC BLOOD PRESSURE: 72 MMHG | BODY MASS INDEX: 28.32 KG/M2 | HEIGHT: 65 IN | TEMPERATURE: 97.9 F | RESPIRATION RATE: 27 BRPM

## 2024-11-20 DIAGNOSIS — H81.10 BENIGN PAROXYSMAL POSITIONAL VERTIGO, UNSPECIFIED LATERALITY: ICD-10-CM

## 2024-11-20 DIAGNOSIS — R07.89 ATYPICAL CHEST PAIN: Primary | ICD-10-CM

## 2024-11-20 LAB
ANION GAP SERPL CALC-SCNC: 8 MMOL/L (ref 3–18)
BASOPHILS # BLD: 0 K/UL (ref 0–0.1)
BASOPHILS NFR BLD: 0 % (ref 0–2)
BUN SERPL-MCNC: 9 MG/DL (ref 7–18)
BUN/CREAT SERPL: 10 (ref 12–20)
CALCIUM SERPL-MCNC: 9.6 MG/DL (ref 8.5–10.1)
CHLORIDE SERPL-SCNC: 106 MMOL/L (ref 100–111)
CO2 SERPL-SCNC: 28 MMOL/L (ref 21–32)
CREAT SERPL-MCNC: 0.88 MG/DL (ref 0.6–1.3)
DIFFERENTIAL METHOD BLD: ABNORMAL
EOSINOPHIL # BLD: 0.1 K/UL (ref 0–0.4)
EOSINOPHIL NFR BLD: 1 % (ref 0–5)
ERYTHROCYTE [DISTWIDTH] IN BLOOD BY AUTOMATED COUNT: 16.2 % (ref 11.6–14.5)
GLUCOSE SERPL-MCNC: 88 MG/DL (ref 74–99)
HCT VFR BLD AUTO: 37.4 % (ref 35–45)
HGB BLD-MCNC: 11.9 G/DL (ref 12–16)
IMM GRANULOCYTES # BLD AUTO: 0 K/UL (ref 0–0.04)
IMM GRANULOCYTES NFR BLD AUTO: 0 % (ref 0–0.5)
LYMPHOCYTES # BLD: 1.8 K/UL (ref 0.9–3.6)
LYMPHOCYTES NFR BLD: 28 % (ref 21–52)
MCH RBC QN AUTO: 26.4 PG (ref 24–34)
MCHC RBC AUTO-ENTMCNC: 31.8 G/DL (ref 31–37)
MCV RBC AUTO: 82.9 FL (ref 78–100)
MONOCYTES # BLD: 0.4 K/UL (ref 0.05–1.2)
MONOCYTES NFR BLD: 6 % (ref 3–10)
NEUTS SEG # BLD: 4.2 K/UL (ref 1.8–8)
NEUTS SEG NFR BLD: 64 % (ref 40–73)
NRBC # BLD: 0 K/UL (ref 0–0.01)
NRBC BLD-RTO: 0 PER 100 WBC
PLATELET # BLD AUTO: 342 K/UL (ref 135–420)
PMV BLD AUTO: 9.3 FL (ref 9.2–11.8)
POTASSIUM SERPL-SCNC: 3.8 MMOL/L (ref 3.5–5.5)
RBC # BLD AUTO: 4.51 M/UL (ref 4.2–5.3)
SODIUM SERPL-SCNC: 142 MMOL/L (ref 136–145)
TROPONIN I SERPL HS-MCNC: <3 NG/L (ref 0–54)
WBC # BLD AUTO: 6.5 K/UL (ref 4.6–13.2)

## 2024-11-20 PROCEDURE — 71046 X-RAY EXAM CHEST 2 VIEWS: CPT

## 2024-11-20 PROCEDURE — 85025 COMPLETE CBC W/AUTO DIFF WBC: CPT

## 2024-11-20 PROCEDURE — 99285 EMERGENCY DEPT VISIT HI MDM: CPT

## 2024-11-20 PROCEDURE — 84484 ASSAY OF TROPONIN QUANT: CPT

## 2024-11-20 PROCEDURE — 93005 ELECTROCARDIOGRAM TRACING: CPT | Performed by: STUDENT IN AN ORGANIZED HEALTH CARE EDUCATION/TRAINING PROGRAM

## 2024-11-20 PROCEDURE — 80048 BASIC METABOLIC PNL TOTAL CA: CPT

## 2024-11-20 RX ORDER — MECLIZINE HYDROCHLORIDE 25 MG/1
25 TABLET ORAL 3 TIMES DAILY PRN
Qty: 30 TABLET | Refills: 0 | Status: SHIPPED | OUTPATIENT
Start: 2024-11-20 | End: 2024-11-30

## 2024-11-20 ASSESSMENT — ENCOUNTER SYMPTOMS
VOMITING: 0
CHEST TIGHTNESS: 0
DIARRHEA: 0
NAUSEA: 0
SHORTNESS OF BREATH: 0
ABDOMINAL PAIN: 0

## 2024-11-20 ASSESSMENT — PAIN DESCRIPTION - LOCATION
LOCATION: CHEST
LOCATION: CHEST

## 2024-11-20 ASSESSMENT — PAIN SCALES - GENERAL
PAINLEVEL_OUTOF10: 0
PAINLEVEL_OUTOF10: 5

## 2024-11-20 ASSESSMENT — PAIN - FUNCTIONAL ASSESSMENT: PAIN_FUNCTIONAL_ASSESSMENT: 0-10

## 2024-11-21 LAB
EKG ATRIAL RATE: 73 BPM
EKG DIAGNOSIS: NORMAL
EKG P AXIS: 62 DEGREES
EKG P-R INTERVAL: 130 MS
EKG Q-T INTERVAL: 394 MS
EKG QRS DURATION: 70 MS
EKG QTC CALCULATION (BAZETT): 434 MS
EKG R AXIS: 25 DEGREES
EKG T AXIS: 59 DEGREES
EKG VENTRICULAR RATE: 73 BPM

## 2024-11-21 PROCEDURE — 93010 ELECTROCARDIOGRAM REPORT: CPT | Performed by: INTERNAL MEDICINE

## 2024-11-21 NOTE — ED PROVIDER NOTES
EMERGENCY DEPARTMENT HISTORY AND PHYSICAL EXAM      Date: 11/20/2024  Patient Name: Lissy Caballero    History of Presenting Illness     Chief Complaint   Patient presents with    Chest Pain       Patient is a 44-year-old female with past medical history of asthma, migraines, presenting to the emergency department for evaluation of chest pain.  Patient states that the pain started last night.  Describing as aching pain with occasional sharp stabbing on the right anterior chest.  It eased up but she felt the same pain again tonight.  States that she has had the same pain in the past but not as severe.  She denies any cardiac history.  Denies any shortness of breath.  The patient also reports that she has been having vertiginous symptoms especially with tilting her head backwards.  Describing room spinning sensation and nausea.  Denies any tinnitus or hearing changes.          PCP: No primary care provider on file.    No current facility-administered medications for this encounter.     Current Outpatient Medications   Medication Sig Dispense Refill    meclizine (ANTIVERT) 25 MG tablet Take 1 tablet by mouth 3 times daily as needed for Dizziness or Nausea 30 tablet 0    ondansetron (ZOFRAN-ODT) 8 MG TBDP disintegrating tablet 1 tablet on the tongue and allow to dissolve  as needed Orally every at 8hrs as needed for 4 days         Past History     Past Medical History:  Past Medical History:   Diagnosis Date    Asthma     Back pain     Chronic sinusitis     Endometriosis     Gastrointestinal disorder     Migraines     Ovarian cyst     Polycystic disease, ovaries        Past Surgical History:  Past Surgical History:   Procedure Laterality Date    CHOLECYSTECTOMY      GYN      ovarian cyst removal, D&C and laproscopic procedure for Endometriosis       Family History:  Family History   Problem Relation Age of Onset    Cancer Paternal Grandfather     Migraines Brother     Cancer Maternal Aunt     Diabetes Maternal Aunt

## 2025-06-05 ENCOUNTER — HOSPITAL ENCOUNTER (EMERGENCY)
Age: 45
Discharge: HOME | End: 2025-06-05
Payer: COMMERCIAL

## 2025-06-05 VITALS
DIASTOLIC BLOOD PRESSURE: 87 MMHG | SYSTOLIC BLOOD PRESSURE: 144 MMHG | OXYGEN SATURATION: 100 % | WEIGHT: 165 LBS | HEIGHT: 65 IN | TEMPERATURE: 97.5 F | HEART RATE: 95 BPM | BODY MASS INDEX: 27.49 KG/M2 | RESPIRATION RATE: 16 BRPM

## 2025-06-05 DIAGNOSIS — H66.90 ACUTE OTITIS MEDIA, UNSPECIFIED OTITIS MEDIA TYPE: Primary | ICD-10-CM

## 2025-06-05 PROCEDURE — 99283 EMERGENCY DEPT VISIT LOW MDM: CPT

## 2025-06-05 RX ORDER — AZITHROMYCIN 250 MG/1
TABLET, FILM COATED ORAL
Qty: 6 TABLET | Refills: 0 | Status: SHIPPED | OUTPATIENT
Start: 2025-06-05

## 2025-06-05 ASSESSMENT — PAIN DESCRIPTION - ORIENTATION: ORIENTATION: RIGHT

## 2025-06-05 ASSESSMENT — PAIN SCALES - GENERAL: PAINLEVEL_OUTOF10: 0

## 2025-06-05 ASSESSMENT — PAIN DESCRIPTION - LOCATION: LOCATION: EAR

## 2025-06-05 ASSESSMENT — PAIN - FUNCTIONAL ASSESSMENT: PAIN_FUNCTIONAL_ASSESSMENT: 0-10

## 2025-06-05 NOTE — ED TRIAGE NOTES
Pt to ED for right ear fullness that started after something (possibly bug) hit her in the side of the head. Pt had gradual onset of right ear fullness with occasional pain.

## 2025-06-05 NOTE — ED PROVIDER NOTES
EMERGENCY DEPARTMENT HISTORY AND PHYSICAL EXAM      Date: 6/5/2025  Patient Name: Lissy Caballero    History of Presenting Illness     Chief Complaint   Patient presents with    Ear Fullness       History (Context): Lissy Caballero is a 44 y.o. female with significant PMHx for PCOS, asthma, endometriosis, migraine presents ambulatory to the ED today.  Patient reports right ear fullness that began 3 days ago.  Patient reports she was at work.  Denies known trauma or injury.  Patient reports then feeling decreased hearing.  Patient reports recurrent ear infections.  Previous history of tubes.  Denies fever, chills, drainage. Patient has attempted cleaning with OTC mediations and also hydrogen peroxide without relief of symptoms.       PCP: No primary care provider on file.    No current facility-administered medications for this encounter.     Current Outpatient Medications   Medication Sig Dispense Refill    azithromycin (ZITHROMAX) 250 MG tablet Take two tabs by mouth on first day, then take on tab daily x 4 days 6 tablet 0       Past History     Past Medical History:   Past Medical History:   Diagnosis Date    Asthma     Back pain     Chronic sinusitis     Endometriosis     Gastrointestinal disorder     Migraines     Ovarian cyst     Polycystic disease, ovaries        Past Surgical History:  Past Surgical History:   Procedure Laterality Date    CHOLECYSTECTOMY      GYN      ovarian cyst removal, D&C and laproscopic procedure for Endometriosis       Family History:  Family History   Problem Relation Age of Onset    Cancer Paternal Grandfather     Migraines Brother     Cancer Maternal Aunt     Diabetes Maternal Aunt     Cancer Maternal Uncle     Cancer Paternal Grandmother     Stroke Maternal Grandmother     Heart Disease Maternal Grandmother     Diabetes Maternal Grandmother     Cancer Maternal Grandmother     Diabetes Maternal Uncle     Migraines Mother     Diabetes Mother     Cancer Mother        Social  History:   Social History     Tobacco Use    Smoking status: Every Day     Current packs/day: 1.00     Types: Cigarettes    Smokeless tobacco: Never    Tobacco comments:     Quit smokin cigarettes a day   Substance Use Topics    Alcohol use: No    Drug use: No       Allergies:  Allergies   Allergen Reactions    Diphenhydramine Rash    Hydrocodone-Acetaminophen Angioedema    Sulfa Antibiotics Angioedema    Montelukast Itching    Cefaclor Rash    Erythromycin Rash    Penicillins Hives and Rash    Propoxyphene Rash         Physical Exam     Vitals:    25 1654   BP: (!) 144/87   Pulse: 95   Resp: 16   Temp: 97.5 °F (36.4 °C)   TempSrc: Oral   SpO2: 100%   Weight: 74.8 kg (165 lb)   Height: 1.651 m (5' 5\")       Physical Exam  Vitals and nursing note reviewed.   Constitutional:       Appearance: Normal appearance.      Comments: Pt in NAD   HENT:      Head: Normocephalic and atraumatic.      Right Ear: Tympanic membrane is erythematous and bulging.      Ears:      Comments: Unable to fully visualize TM  Large amount of cerumen  Eyes:      General: No scleral icterus.  Cardiovascular:      Rate and Rhythm: Normal rate and regular rhythm.      Pulses: No decreased pulses.   Pulmonary:      Effort: Pulmonary effort is normal.      Breath sounds: Normal breath sounds and air entry.   Abdominal:      General: There is no distension.      Palpations: Abdomen is soft.      Tenderness: There is no abdominal tenderness.   Musculoskeletal:      Cervical back: Full passive range of motion without pain.      Right lower leg: No edema.      Left lower leg: No edema.   Skin:     General: Skin is warm and dry.      Capillary Refill: Capillary refill takes less than 2 seconds.   Neurological:      Mental Status: She is alert and oriented to person, place, and time. Mental status is at baseline.   Psychiatric:         Attention and Perception: Attention normal.         Diagnostic Study Results     Labs -   No results found for